# Patient Record
Sex: FEMALE | Race: WHITE | NOT HISPANIC OR LATINO | URBAN - METROPOLITAN AREA
[De-identification: names, ages, dates, MRNs, and addresses within clinical notes are randomized per-mention and may not be internally consistent; named-entity substitution may affect disease eponyms.]

---

## 2017-08-01 PROBLEM — 282825002 PAROXYSMAL ATRIAL FIBRILLATION: Status: ACTIVE | Noted: 2017-08-01

## 2017-08-01 PROBLEM — 275978004 SCREENING FOR MALIGNANT NEOPLASM OF COLON: Status: ACTIVE | Noted: 2017-08-01

## 2022-04-30 ENCOUNTER — TELEPHONE ENCOUNTER (OUTPATIENT)
Dept: URBAN - METROPOLITAN AREA CLINIC 121 | Facility: CLINIC | Age: 62
End: 2022-04-30

## 2022-04-30 RX ORDER — LINACLOTIDE 145 UG/1
TAKE 1 CAPSULE PO QD CAPSULE, GELATIN COATED ORAL
OUTPATIENT
Start: 2017-08-01

## 2022-05-01 ENCOUNTER — TELEPHONE ENCOUNTER (OUTPATIENT)
Dept: URBAN - METROPOLITAN AREA CLINIC 121 | Facility: CLINIC | Age: 62
End: 2022-05-01

## 2022-05-01 RX ORDER — ESTRADIOL 1 MG/1
TABLET ORAL
Status: ACTIVE | COMMUNITY
Start: 2017-08-01

## 2022-05-01 RX ORDER — FAMOTIDINE 10 MG
TABLET ORAL
Status: ACTIVE | COMMUNITY
Start: 2017-08-01

## 2022-05-01 RX ORDER — ASPIRIN 81 MG/1
TABLET, DELAYED RELEASE ORAL
Status: ACTIVE | COMMUNITY
Start: 2017-08-01

## 2022-05-01 RX ORDER — ESOMEPRAZOLE MAGNESIUM 40 MG
CAPSULE,DELAYED RELEASE (ENTERIC COATED) ORAL
Status: ACTIVE | COMMUNITY
Start: 2017-08-01

## 2022-05-01 RX ORDER — LINACLOTIDE 145 UG/1
TAKE 1 CAPSULE BY MOUTH EVERY DAY CAPSULE, GELATIN COATED ORAL
Status: ACTIVE | COMMUNITY
Start: 2018-12-10

## 2022-05-01 RX ORDER — FLECAINIDE ACETATE 50 MG/1
TABLET ORAL
Status: ACTIVE | COMMUNITY
Start: 2017-08-01

## 2022-05-01 RX ORDER — PROGESTERONE 100 MG/1
CAPSULE ORAL
Status: ACTIVE | COMMUNITY
Start: 2017-08-01

## 2022-09-09 ENCOUNTER — OFFICE VISIT (OUTPATIENT)
Dept: URBAN - METROPOLITAN AREA CLINIC 27 | Facility: CLINIC | Age: 62
End: 2022-09-09
Payer: COMMERCIAL

## 2022-09-09 ENCOUNTER — DASHBOARD ENCOUNTERS (OUTPATIENT)
Age: 62
End: 2022-09-09

## 2022-09-09 VITALS
BODY MASS INDEX: 23.9 KG/M2 | DIASTOLIC BLOOD PRESSURE: 74 MMHG | HEART RATE: 69 BPM | HEIGHT: 64 IN | WEIGHT: 140 LBS | TEMPERATURE: 97.1 F | SYSTOLIC BLOOD PRESSURE: 113 MMHG

## 2022-09-09 DIAGNOSIS — K21.9 GASTRO-ESOPHAGEAL REFLUX DISEASE WITHOUT ESOPHAGITIS: ICD-10-CM

## 2022-09-09 DIAGNOSIS — K59.01 CONSTIPATION: ICD-10-CM

## 2022-09-09 PROBLEM — 14760008 CONSTIPATION: Status: ACTIVE | Noted: 2017-08-01

## 2022-09-09 PROBLEM — 266435005 GASTRO-ESOPHAGEAL REFLUX DISEASE WITHOUT ESOPHAGITIS: Status: ACTIVE | Noted: 2017-08-01

## 2022-09-09 PROCEDURE — 99242 OFF/OP CONSLTJ NEW/EST SF 20: CPT | Performed by: INTERNAL MEDICINE

## 2022-09-09 PROCEDURE — 99202 OFFICE O/P NEW SF 15 MIN: CPT | Performed by: INTERNAL MEDICINE

## 2022-09-09 RX ORDER — PROGESTERONE 100 MG/1
CAPSULE ORAL
Status: ACTIVE | COMMUNITY
Start: 2017-08-01

## 2022-09-09 RX ORDER — ASPIRIN 81 MG/1
TABLET, DELAYED RELEASE ORAL
Status: ACTIVE | COMMUNITY
Start: 2017-08-01

## 2022-09-09 RX ORDER — LINACLOTIDE 145 UG/1
TAKE 1 CAPSULE BY MOUTH EVERY DAY CAPSULE, GELATIN COATED ORAL
Status: ACTIVE | COMMUNITY
Start: 2018-12-10

## 2022-09-09 RX ORDER — ESOMEPRAZOLE MAGNESIUM 40 MG
CAPSULE,DELAYED RELEASE (ENTERIC COATED) ORAL
Status: ACTIVE | COMMUNITY
Start: 2017-08-01

## 2022-09-09 RX ORDER — FLECAINIDE ACETATE 50 MG/1
TABLET ORAL
Status: ACTIVE | COMMUNITY
Start: 2017-08-01

## 2022-09-09 RX ORDER — ESTRADIOL 1 MG/1
TABLET ORAL
Status: ACTIVE | COMMUNITY
Start: 2017-08-01

## 2022-09-09 RX ORDER — FAMOTIDINE 10 MG
TABLET ORAL
Status: ACTIVE | COMMUNITY
Start: 2017-08-01

## 2022-09-09 NOTE — HPI-TODAY'S VISIT:
This is a 62-year-old female seen in consultation today for colon cancer screening evaluation.  She underwent colonoscopy in 2017.  A benign polyp was removed.  No family history of colon cancer or colon polyps.  She has chronic constipation and uses senna and magnesium.  She tried Linzess but that did not work.  She tried MiraLAX that did not help.  She had atrial ablation for A. fib and now is normal sinus rhythm.  She takes omeprazole for reflux that is stable no dysphagia.  She is otherwise doing well

## 2022-09-17 ENCOUNTER — LAB OUTSIDE AN ENCOUNTER (OUTPATIENT)
Dept: URBAN - METROPOLITAN AREA CLINIC 27 | Facility: CLINIC | Age: 62
End: 2022-09-17

## 2022-09-25 LAB — OCCULT BLOOD, FECAL, IA: (no result)
